# Patient Record
Sex: FEMALE
[De-identification: names, ages, dates, MRNs, and addresses within clinical notes are randomized per-mention and may not be internally consistent; named-entity substitution may affect disease eponyms.]

---

## 2021-01-15 ENCOUNTER — TRANSCRIPTION ENCOUNTER (OUTPATIENT)
Age: 26
End: 2021-01-15

## 2021-06-04 PROBLEM — Z00.00 ENCOUNTER FOR PREVENTIVE HEALTH EXAMINATION: Status: ACTIVE | Noted: 2021-06-04

## 2022-08-10 ENCOUNTER — APPOINTMENT (OUTPATIENT)
Dept: OBGYN | Facility: CLINIC | Age: 27
End: 2022-08-10

## 2022-08-10 VITALS
WEIGHT: 159.06 LBS | DIASTOLIC BLOOD PRESSURE: 73 MMHG | BODY MASS INDEX: 23.56 KG/M2 | HEART RATE: 74 BPM | SYSTOLIC BLOOD PRESSURE: 113 MMHG | HEIGHT: 69 IN

## 2022-08-10 DIAGNOSIS — Z01.419 ENCOUNTER FOR GYNECOLOGICAL EXAMINATION (GENERAL) (ROUTINE) W/OUT ABNORMAL FINDINGS: ICD-10-CM

## 2022-08-10 PROCEDURE — 99385 PREV VISIT NEW AGE 18-39: CPT

## 2022-08-10 PROCEDURE — 36415 COLL VENOUS BLD VENIPUNCTURE: CPT

## 2022-08-10 NOTE — END OF VISIT
[FreeTextEntry3] : I, Ayaan Lutherkaris acted as a scribe on behalf of Dr. Eduardo on 08/10/2022 \par \par All medical entries made by the scribe were at my, Dr. Eduardo, direction and personally dictated by me on 08/10/2022. I have reviewed the chart and agree that the record accurately reflects my personal performance of the history, physical exam, assessment and plan. I have also personally directed, reviewed, and agreed with the chart.\par

## 2022-08-10 NOTE — HISTORY OF PRESENT ILLNESS
[N] : Patient denies prior pregnancies [Regular Cycle Intervals] : periods have been regular [No] : Patient does not have concerns regarding sex [Patient would like to be screened for STIs] : Patient would like to be screened for STIs [FreeTextEntry1] : GLORIA ESTRADA 27 year old  F pt presents as new pt for annual exam. Pt is doing well and has no issues. Denies hx of abnormal pap. Denies sig PMH, no PSH. No hx of STD. Periods are regular, monthly. Pt would like STD testing today. \par Denies any breast issues. Denies abdominal pain, no pain in legs. 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 \par \par PFMHx: Grandmother- ovarian, breast cancer  [TextBox_4] : New pt, annual visit  [LMPDate] : 08/05/22

## 2022-08-10 NOTE — PLAN
[FreeTextEntry1] : GLORIA ESTRADA 27 year old F pt presents as new pt for annual exam.\par \par 1. HCM\par -Pap test collected and sent at today's visit\par -std testing \par -Discussed and reviewed importance of monthly BSE\par -reviewed OCP/LARC, declining\par -RTO annually unless acute issue\par Alma Eduardo MD \par \par

## 2022-08-11 LAB
C TRACH RRNA SPEC QL NAA+PROBE: NOT DETECTED
HBV SURFACE AG SER QL: NONREACTIVE
HIV1+2 AB SPEC QL IA.RAPID: NONREACTIVE
N GONORRHOEA RRNA SPEC QL NAA+PROBE: NOT DETECTED
SOURCE TP AMPLIFICATION: NORMAL
T PALLIDUM AB SER QL IA: NEGATIVE

## 2022-08-16 LAB — CYTOLOGY CVX/VAG DOC THIN PREP: NORMAL

## 2022-11-16 ENCOUNTER — APPOINTMENT (OUTPATIENT)
Dept: OBGYN | Facility: CLINIC | Age: 27
End: 2022-11-16

## 2022-11-16 VITALS
BODY MASS INDEX: 23.49 KG/M2 | SYSTOLIC BLOOD PRESSURE: 115 MMHG | HEIGHT: 68 IN | DIASTOLIC BLOOD PRESSURE: 69 MMHG | WEIGHT: 155 LBS

## 2022-11-16 DIAGNOSIS — Z13.79 ENCOUNTER FOR OTHER SCREENING FOR GENETIC AND CHROMOSOMAL ANOMALIES: ICD-10-CM

## 2022-11-16 PROCEDURE — 99213 OFFICE O/P EST LOW 20 MIN: CPT

## 2022-11-16 PROCEDURE — 36415 COLL VENOUS BLD VENIPUNCTURE: CPT

## 2022-11-16 NOTE — HISTORY OF PRESENT ILLNESS
[FreeTextEntry1] : A 26 YO pt here for genetic testing. Pt is not actively wanting to get pregnant but is planning on it for the future. States she just wants to make sure she doesn’t carry any genetic disorders prior to trying to get pregnant. Gets regular periods, no genetic, structural, birth defects in family or partners family. \par \par -Grandmother on fathers side had the BRCA gene with ovarian cancer, father tested negative.  [TextBox_4] : Pt interested genetic and fertility testing [PapSmeardate] : 8/2022 [LMPDate] : 11/15/2022

## 2022-11-16 NOTE — PLAN
[FreeTextEntry1] : 27 year old here for genetic testing\par -sema performed\par -discussed limitations of [facility], order placed for lab draw\par Alma Eduardo MD \par

## 2022-11-30 ENCOUNTER — TRANSCRIPTION ENCOUNTER (OUTPATIENT)
Age: 27
End: 2022-11-30

## 2022-12-08 ENCOUNTER — TRANSCRIPTION ENCOUNTER (OUTPATIENT)
Age: 27
End: 2022-12-08

## 2022-12-12 ENCOUNTER — NON-APPOINTMENT (OUTPATIENT)
Age: 27
End: 2022-12-12

## 2022-12-13 ENCOUNTER — NON-APPOINTMENT (OUTPATIENT)
Age: 27
End: 2022-12-13

## 2023-01-24 ENCOUNTER — NON-APPOINTMENT (OUTPATIENT)
Age: 28
End: 2023-01-24